# Patient Record
(demographics unavailable — no encounter records)

---

## 2024-10-15 NOTE — DISCUSSION/SUMMARY
[FreeTextEntry1] : Spoke to mother re low vitamin level. Prescribed Chewable 1000u of Vit D to take daily X 3 month and then to check level again.

## 2024-10-15 NOTE — PLAN
[FreeTextEntry1] : Advised to continue the Keppra in the current dose. Advised that in the future will taper it slowly by not adjusting for growth.

## 2024-10-15 NOTE — PHYSICAL EXAM
[Well-appearing] : well-appearing [No dysmorphic facial features] : no dysmorphic facial features [Well related, good eye contact] : well related, good eye contact [Normal speech and language] : normal speech and language [Follows instructions well] : follows instructions well [Full extraocular movements] : full extraocular movements [Normal axial and appendicular muscle tone] : normal axial and appendicular muscle tone [No abnormal involuntary movements] : no abnormal involuntary movements [Good walking balance] : good walking balance [Normal gait] : normal gait

## 2024-10-15 NOTE — HISTORY OF PRESENT ILLNESS
[Home] : at home, [unfilled] , at the time of the visit. [FreeTextEntry1] : 10/16/2019: with parents. A previously healthy child who had 4 convulsive seizures when the family was en route to [place]. Seizures lasted few minutes and appeared convulsive. Seen in 3 hospitals in [place]. REEG: focal slowing left centro temporal.  Was started on Keppra 250mg BID. Diastat 5mg OR prn seizures.  Well since Keppra was started    10/30/2019: with parents. On Keppra 100mg/1ML, 3ML BID. Remains seizure free. Possibly more irritable   3/4/2020: With parents. Remains seizure free on Keppra 300mg BID.No new complaints.   6/30/2020: with her parents. Remains seizure free since we started the Keppra. Now takes 400mg twice daily. No new complaints were reported.   3/25/2021 with her mother. Child had a brief episode of shaking with eye rolling  yesterday. REEG today was preliminary normal. Thriving well. OK in school.   6/23/2021 with her parents in a Telehealth visit. Remains seizure free on Keppra 5Mls BID. In March 25 her Keppra level was 22 and a REEG was normal.   96/23/2021 with the mother in a Telehealth visit. Child was in school and could not be seen today. Remains seizure free on Keppra 5Mls BID. In March 25 her Keppra level was 22 and a REEG was normal.   11/11/2021 96/23/2021 with the mother . Remains seizure free on [id number]/ML,  5Mls BID. In March 25 her Keppra level was 22 and a REEG was normal.   11/10/2022 11/11/2021 96/23/2021 with the mother . Remains seizure free on [id number]/ML,  5Mls BID.  5/30/23: with the mother . Remains seizure free on [id number]/ML,  5Mls BID.  7/7/2023 With mother in a Telehealth visit. Remains seizure free on Keppra 500mg BID.  9/12/2024  Remains seizure free on [id number]/ML,  5Mls BID. Last seizure 2021  10/15/24  with her mother in a TEB visit. I advised that the recent AEEG continues showing Left CT spike during sleep. Remains seizure free on [id number]/ML,  5Mls BID. Last seizure 2021 [Medical Office: ([facility])___] : at the medical office located in

## 2025-03-03 NOTE — HISTORY OF PRESENT ILLNESS
[Home] : at home, [unfilled] , at the time of the visit. [Medical Office: ([facility])___] : at the medical office located in  [FreeTextEntry1] : 10/16/2019: with parents. A previously healthy child who had 4 convulsive seizures when the family was en route to [place]. Seizures lasted few minutes and appeared convulsive. Seen in 3 hospitals in [place]. REEG: focal slowing left centro temporal.  Was started on Keppra 250mg BID. Diastat 5mg NM prn seizures.  Well since Keppra was started    10/30/2019: with parents. On Keppra 100mg/1ML, 3ML BID. Remains seizure free. Possibly more irritable   3/4/2020: With parents. Remains seizure free on Keppra 300mg BID.No new complaints.   6/30/2020: with her parents. Remains seizure free since we started the Keppra. Now takes 400mg twice daily. No new complaints were reported.   3/25/2021 with her mother. Child had a brief episode of shaking with eye rolling  yesterday. REEG today was preliminary normal. Thriving well. OK in school.   6/23/2021 with her parents in a Telehealth visit. Remains seizure free on Keppra 5Mls BID. In March 25 her Keppra level was 22 and a REEG was normal.   96/23/2021 with the mother in a Telehealth visit. Child was in school and could not be seen today. Remains seizure free on Keppra 5Mls BID. In March 25 her Keppra level was 22 and a REEG was normal.   11/11/2021 96/23/2021 with the mother . Remains seizure free on [id number]/ML,  5Mls BID. In March 25 her Keppra level was 22 and a REEG was normal.   11/10/2022 11/11/2021 96/23/2021 with the mother . Remains seizure free on Yvtukf248mg/ML,  5Mls BID.  5/30/23: with the mother . Remains seizure free on [id number]/ML,  5Mls BID.  7/7/2023 With mother in a Telehealth visit. Remains seizure free on Keppra 500mg BID.  9/12/2024  Remains seizure free on [id number]/ML,  5Mls BID. Last seizure 2021  10/15/24  with her mother in a TEB visit. I advised that the recent AEEG continues showing Left CT spike during sleep. Remains seizure free on [id number]/ML,  5Mls BID. Last seizure 2021  3/3/2025 with the father. Child had a 2-3 minuts seizutes last night when asleept. Taken to [facility]. Routine bloods were taken.Keppra 100mg/ML was increased to 7.5ML BID. Doing OK now.